# Patient Record
Sex: FEMALE | Race: WHITE | Employment: FULL TIME
[De-identification: names, ages, dates, MRNs, and addresses within clinical notes are randomized per-mention and may not be internally consistent; named-entity substitution may affect disease eponyms.]

---

## 2023-05-21 ENCOUNTER — TRANSCRIBE ORDERS (OUTPATIENT)
Dept: OTHER | Age: 58
End: 2023-05-21

## 2023-05-21 DIAGNOSIS — R90.82 WHITE MATTER ABNORMALITY ON MRI OF BRAIN: Primary | ICD-10-CM

## 2023-05-21 DIAGNOSIS — G37.9 DEMYELINATING DISEASE (H): ICD-10-CM

## 2023-05-25 NOTE — TELEPHONE ENCOUNTER
RECORDS RECEIVED FROM: Internal   REASON FOR VISIT: White matter abnormality on MRI of brain, Demyelinating disease   Date of Appt: 07/17/2023   NOTES (FOR ALL VISITS) STATUS DETAILS   OFFICE NOTE from referring provider Care Everywhere 04/18/2023 [facility]    OFFICE NOTE from other specialist N/A    DISCHARGE SUMMARY from hospital N/A    DISCHARGE REPORT from the ER N/A    OPERATIVE REPORT N/A    [facility] (MS ONLY) N/A    EMG N/A    EEG N/A    MEDICATION LIST N/A    IMAGING  (FOR ALL VISITS)     LUMBAR PUNCTURE N/A    BENTLEY SCAN (MOVEMENT) N/A    ULTRASOUND (CAROTID BILAT) *VASCULAR* N/A    MRI (HEAD, NECK, SPINE) Received 05/12/2023 head   CT (HEAD, NECK, SPINE) N/A       Images in PACS

## 2023-07-17 ENCOUNTER — OFFICE VISIT (OUTPATIENT)
Dept: NEUROLOGY | Facility: CLINIC | Age: 58
End: 2023-07-17
Payer: COMMERCIAL

## 2023-07-17 ENCOUNTER — PRE VISIT (OUTPATIENT)
Dept: NEUROLOGY | Facility: CLINIC | Age: 58
End: 2023-07-17

## 2023-07-17 VITALS — HEART RATE: 103 BPM | SYSTOLIC BLOOD PRESSURE: 128 MMHG | WEIGHT: 200 LBS | DIASTOLIC BLOOD PRESSURE: 85 MMHG

## 2023-07-17 DIAGNOSIS — G90.2 ACQUIRED RIGHT-SIDED HORNER SYNDROME: ICD-10-CM

## 2023-07-17 DIAGNOSIS — R90.82 WHITE MATTER ABNORMALITY ON MRI OF BRAIN: Primary | ICD-10-CM

## 2023-07-17 PROCEDURE — 99205 OFFICE O/P NEW HI 60 MIN: CPT | Performed by: PSYCHIATRY & NEUROLOGY

## 2023-07-17 RX ORDER — ASPIRIN 81 MG/1
81 TABLET ORAL
COMMUNITY

## 2023-07-17 RX ORDER — BRIMONIDINE TARTRATE AND TIMOLOL MALEATE 2; 5 MG/ML; MG/ML
SOLUTION OPHTHALMIC
COMMUNITY
Start: 2022-11-02

## 2023-07-17 RX ORDER — LISINOPRIL 10 MG/1
1 TABLET ORAL EVERY MORNING
COMMUNITY
Start: 2023-06-15

## 2023-07-17 RX ORDER — MONTELUKAST SODIUM 10 MG/1
1 TABLET ORAL EVERY MORNING
COMMUNITY
Start: 2023-06-15

## 2023-07-17 RX ORDER — CETIRIZINE HYDROCHLORIDE 10 MG/1
10 TABLET ORAL DAILY
COMMUNITY

## 2023-07-17 RX ORDER — ATORVASTATIN CALCIUM 20 MG/1
1 TABLET, FILM COATED ORAL AT BEDTIME
COMMUNITY
Start: 2023-06-15

## 2023-07-17 NOTE — PATIENT INSTRUCTIONS
I am recommending that you undergo MRI scans of the cervical and thoracic spine with and without contrast to look for any evidence of demyelinating disease in the spinal cord.    2.   We will refer you to neuro-ophthalmology at the [facility] for an eye exam to look for any evidence of demyelinating disease affecting visual pathways

## 2023-07-17 NOTE — LETTER
7/17/2023       RE: Deborah Dumont  [address] [place] [address]     Dear Dr. Schneider,    Thank you for referring your patient, Deborah Dumont, to the Multiple Sclerosis Clinic at [facility] [place]. Please see a copy of my visit note below.    Referral source:  Daysi Schneider MD  [facility]  [address] [id number]    Chief complaint: White matter abnormality on MRI scan of the brain; concern for CNS demyelinating disease    History of the Present Illness: Ms. Deborah Dumont is a 58 year old right-handed woman who is evaluated in the Multiple Sclerosis clinic today at the request of Dr. Schneider for an opinion regarding concern for multiple sclerosis.    The patient recalls that the possibility of demyelinating disease was initially mentioned when she was in her 30s and was felt to have some thinning of the left optic nerve detected on a routine eye examination. (She did not, then or now, have any history suggestive of a clinical episode of optic neuritis.)    A number of years later she was evaluated by my colleague, Dr. Bhargav Tee, on 5/13/2011.  Dr. Tee reviewed an MRI scan of the brain performed earlier that year, showing non-specific white matter abnormalities.  Optical coherence tomography study demonstrated some apparent retinal nerve fiber layer thinning on the right with no detected abnormality in the left. The patient subsequently underwent a cerebrospinal fluid evaluation demonstrating normal IgG index of 0.43 and no oligoclonal bands.  The index of suspicion for multiple sclerosis was low, and no follow-up was advised.    This spring, the patient was evaluated by Dr. Schneider after presenting with a complaint of dizziness.  This was felt to be due to benign paroxysmal positional vertigo and vestibular evaluation was consistent with that diagnosis as well.    The patient did undergo a repeat MRI scan of the brain demonstrating an increased  number of T2 hyperintense foci in the white matter as compared to earlier MRI in 2011, and again concern about the possibility of demyelinating disease was raised.    She was referred to this clinic for an opinion in this regard.    Review of Systems: In retrospect, the patient has been noticing some mild drooping of the right eyelid for approximately 1 year.    She notes that intermittently she will wake up with some numbness and tingling of the hands.    She endorses some difficulty recalling names or the word that she wants to say.    She describes bothersome fatigue.    Past Medical History:  Status post right carotid endarterectomy  Allergic rhinitis  Hyperlipidemia  Glaucoma  Gastroesophageal reflux disease  Hypertension   Depression  Vitiligo    Medications:  Atorvastatin 20 mg p.o. daily  Lisinopril 10 mg p.o. daily  Montelukast 10 mg p.o. daily  Esomeprazole 20 mg p.o. daily  Aspirin 81 mg p.o. daily  Fluoxetine 40 mg p.o. daily    Family History: There is no family history of multiple sclerosis of which she is aware.  Her sister is apparently felt to have some type of autoimmune disorder with Raynaud's phenomenon.  Her daughter has inflammatory bowel disease.    Social History: The patient is employed as a  for [employer].  She is a lifelong non-smoker.    PHYSICAL EXAMINATION:  VITAL SIGNS: Blood pressure 128/85; pulse 103; weight 90.7 kg.  GENERAL: Well-nourished woman who presents to the examination alone, awake and alert and in no acute distress.    NEUROLOGIC EXAMINATION:  CRANIAL NERVES: The right pupil is miotic (more evident in low light) and there is mild ptosis of the right eyelid.  Visual fields are full to confrontation.  Extraocular movements are intact with no internuclear ophthalmoplegia.  Facial strength is normal.  Palate elevation and tongue protrusion are normal.  POWER: Strength is within normal limits in proximal and distal muscles in the upper and lower limbs  throughout.  REFLEXES: Reflexes are symmetric and within normal limits in the arms and legs.  MOTOR/CEREBELLAR: There is no appendicular ataxia on finger-to-nose testing.  Rapid alternating movements are within normal limits in the hands and fingers.  There is no pronator drift in the arms.  GAIT: The patient is able to ambulate on a flat, level surface with no gross loss of postural stability.  She can walk on heels and toes.  Tandem gait is moderately impaired for age.    Investigations: I reviewed images from an MRI scan of the brain dated 5/12/2023.  There is no abnormal enhancement with gadolinium contrast.  There are several T2 hyperintense foci in the white matter of the cerebral hemispheres bilaterally as well as some increased T2 signal in the bradford.  A few of the supratentorial lesions are periventricular or juxtacortical in location.  Overall, the findings could be seen in multiple sclerosis in the appropriate clinical context, but are not pathognomonic for demyelinating pathology.    Assessment/plan    1. White matter abnormalities on MRI scan of the brain  I discussed several issues with the patient.    First, a diagnosis of multiple sclerosis cannot be made solely on the basis of MRI imaging.  All of the diagnostic criteria for MS were validated in patients with typical clinical syndromes of demyelinating disease, including a typical attack of active inflammatory demyelination or a progressive motor syndrome.  Neither of these are present here, and accordingly a diagnosis of MS cannot be made.    Regarding the appearance of the imaging, my suspicion for underlying demyelinating pathology is intermediate.  The primary differential diagnosis would be white matter changes due to small vessel ischemic disease, which is certainly possible in a patient in her age group with vascular risk factors.    Even if I were certain that she had underlying demyelinating disease (and again, I am not), the typical natural  history of multiple sclerosis in a patient approaching the seventh decade of life is often that the active inflammatory component of the disease process has burned itself out.  Accordingly, it is unlikely that we would be recommending disease modifying therapies for multiple sclerosis at this juncture.    We discussed other diagnostic investigations.  Previous spinal imaging was unrevealing, but I would suggest repeating MRI scans of the cervical and thoracic spine at this time.  Asymptomatic white matter abnormalities in the cord are much less common than in the brain and if present, would heighten the suspicion for an underlying demyelinating process.  Again, I would not be likely to recommend immune therapies at this time unless there were radiologic evidence of active inflammatory demyelination (that is, contrast-enhancing lesions), which is quite unlikely..    Lastly, as discussed further below I would recommend a neuro-ophthalmology examination to look for any evidence of demyelinating disease affecting the visual pathways.    After we review the above studies, we will decide on timing of ,and need for, further follow-up.  If there are no other remarkable findings identified, I would likely just recommend a follow-up brain MRI in 12 to 18 months.  If this remains stable and there are no further neurologic symptoms, I think further monitoring could be deferred.    2.  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    Anatomically, her prior right carotid surgery could certainly account for this, although interestingly she is quite certain that the eyelid droop did not become apparent until well after the surgery.    This should not be related to demyelinating disease and I suspect that the yield of additional imaging studies would be low.    3.   Query optic neuropathy  Some concern has been expressed about asymptomatic optic neuropathy on prior examinations.  I told her that I would suggest that we have her see one of the neuro-ophthalmologists at the [place] for a comprehensive eye exam and to look for any evidence of demyelinating disease affecting the visual pathways.    I spent a total of 61 minutes on patient care activities related to this encounter on the date of service, including time spent in reviewing internal and external medical records, performing my own independent review of neuroimaging, obtaining history and examination from and in counseling the patient.    Again, thank you for allowing me to participate in the care of your patient.      Sincerely,    Alexis Peterson MD   of Neurology  [facility]    Cc:  Patient

## 2023-07-17 NOTE — Clinical Note
7/17/2023         RE: Deborah Dumont  [address] [place] [address]        Dear Colleague,    Thank you for referring your patient, Deborah Dumont, to the [facility] [place]. Please see a copy of my visit note below.    Referral source:  Daysi Schneider MD  [facility]  [address] [place] [address]    Chief complaint: White matter abnormality on MRI scan of the brain; concern for CNS demyelinating disease    History of the Present Illness: Ms. Deborah Dumont is a 58 year old right-handed woman who is evaluated in the Multiple Sclerosis clinic today at the request of Dr. Schneider for an opinion regarding concern for multiple sclerosis.    The patient recalls that the possibility of demyelinating disease was initially mentioned when she was in her 30s and was felt to have some thinning of the left optic nerve detected on a routine eye examination. (She did not, then or now, have any history suggestive of a clinical episode of optic neuritis.)    A number of years later she was evaluated by my colleague, Dr. Bhargav Tee, on 5/13/2011.  Dr. Tee reviewed an MRI scan of the brain performed earlier that year, showing non-specific white matter abnormalities.  Optical coherence tomography study demonstrated some apparent retinal nerve fiber layer thinning on the right with no detected abnormality in the left. The patient subsequently underwent a cerebrospinal fluid evaluation demonstrating normal IgG index of 0.43 and no oligoclonal bands.  The index of suspicion for multiple sclerosis was low, and no follow-up was advised.    This spring, the patient was evaluated by Dr. Schneider after presenting with a complaint of dizziness.  This was felt to be due to benign paroxysmal positional vertigo and vestibular evaluation was consistent with that diagnosis as well.    The patient did undergo a repeat MRI scan of the brain demonstrating an increased number of T2  hyperintense foci in the white matter as compared to earlier MRI in 2011, and again concern about the possibility of demyelinating disease was raised.    She was referred to this clinic for an opinion in this regard.    Review of Systems: In retrospect, the patient has been noticing some mild drooping of the right eyelid for approximately 1 year.    She notes that intermittently she will wake up with some numbness and tingling of the hands.    She endorses some difficulty recalling names or the word that she wants to say.    She describes bothersome fatigue.    Past Medical History:  Status post right carotid endarterectomy  Allergic rhinitis  Hyperlipidemia  Glaucoma  Gastroesophageal reflux disease  Hypertension   Depression  Vitiligo    Medications:  Atorvastatin 20 mg p.o. daily  Lisinopril 10 mg p.o. daily  Montelukast 10 mg p.o. daily  Esomeprazole 20 mg p.o. daily  Aspirin 81 mg p.o. daily  Fluoxetine 40 mg p.o. daily    Family History: There is no family history of multiple sclerosis of which she is aware.  Her sister is apparently felt to have some type of autoimmune disorder with Raynaud's phenomenon.  Her daughter has inflammatory bowel disease.    Social History: The patient is employed as a  for [employer].  She is a lifelong non-smoker.    PHYSICAL EXAMINATION:  VITAL SIGNS: Blood pressure 128/85; pulse 103; weight 90.7 kg.  GENERAL: Well-nourished woman who presents to the examination alone, awake and alert and in no acute distress.    NEUROLOGIC EXAMINATION:  CRANIAL NERVES: The right pupil is miotic (more evident in low light) and there is mild ptosis of the right eyelid.  Visual fields are full to confrontation.  Extraocular movements are intact with no internuclear ophthalmoplegia.  Facial strength is normal.  Palate elevation and tongue protrusion are normal.  POWER: Strength is within normal limits in proximal and distal muscles in the upper and lower limbs  throughout.  REFLEXES: Reflexes are symmetric and within normal limits in the arms and legs.  MOTOR/CEREBELLAR: There is no appendicular ataxia on finger-to-nose testing.  Rapid alternating movements are within normal limits in the hands and fingers.  There is no pronator drift in the arms.  GAIT: The patient is able to ambulate on a flat, level surface with no gross loss of postural stability.  She can walk on heels and toes.  Tandem gait is moderately impaired for age.    Investigations: I reviewed images from an MRI scan of the brain dated 5/12/2023.  There is no abnormal enhancement with gadolinium contrast.  There are several T2 hyperintense foci in the white matter of the cerebral hemispheres bilaterally as well as some increased T2 signal in the bradford.  A few of the supratentorial lesions are periventricular or juxtacortical in location.  Overall, the findings could be seen in multiple sclerosis in the appropriate clinical context, but are not pathognomonic for demyelinating pathology.    Assessment/plan    1. White matter abnormalities on MRI scan of the brain  I discussed several issues with the patient.    First, a diagnosis of multiple sclerosis cannot be made solely on the basis of MRI imaging.  All of the diagnostic criteria for MS were validated in patients with typical clinical syndromes of demyelinating disease, including a typical attack of active inflammatory demyelination or a progressive motor syndrome.  Neither of these are present here, and accordingly a diagnosis of MS cannot be made.    Regarding the appearance of the imaging, my suspicion for underlying demyelinating pathology is intermediate.  The primary differential diagnosis would be white matter changes due to small vessel ischemic disease, which is certainly possible in a patient in her age group with vascular risk factors.    Even if I were certain that she had underlying demyelinating disease (and again, I am not), the typical natural  history of multiple sclerosis in a patient approaching the seventh decade of life is often that the active inflammatory component of the disease process has burned itself out.  Accordingly, it is unlikely that we would be recommending disease modifying therapies for multiple sclerosis at this juncture.    We discussed other diagnostic investigations.  Previous spinal imaging was unrevealing, but I would suggest repeating MRI scans of the cervical and thoracic spine at this time.  Asymptomatic white matter abnormalities in the cord are much less common than in the brain and if present, would heighten the suspicion for an underlying demyelinating process.  Again, I would not be likely to recommend immune therapies at this time unless there were radiologic evidence of active inflammatory demyelination (that is, contrast-enhancing lesions), which is quite unlikely..    Lastly, as discussed further below I would recommend a neuro-ophthalmology examination to look for any evidence of demyelinating disease affecting the visual pathways.    After we review the above studies, we will decide on timing of ,and need for, further follow-up.  If there are no other remarkable findings identified, I would likely just recommend a follow-up brain MRI in 12 to 18 months.  If this remains stable and there are no further neurologic symptoms, I think further monitoring could be deferred.    2.  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    Anatomically, her prior right carotid surgery could certainly account for this, although interestingly she is quite certain that the eyelid droop did not become apparent until well after the surgery.    This should not be related to demyelinating disease and I suspect that the yield of additional imaging studies would be low.    3.   Query optic neuropathy  Some concern has been expressed about asymptomatic optic neuropathy on prior examinations.  I told her that I would suggest that we have her see one of the neuro-ophthalmologists at the [place] for a comprehensive eye exam and to look for any evidence of demyelinating disease affecting the visual pathways.    I spent a total of 61 minutes on patient care activities related to this encounter on the date of service, including time spent in reviewing internal and external medical records, performing my own independent review of neuroimaging, obtaining history and examination from and in counseling the patient.      Again, thank you for allowing me to participate in the care of your patient.        Sincerely,        Alexis Peterson MD

## 2023-09-12 ENCOUNTER — TELEPHONE (OUTPATIENT)
Dept: NEUROLOGY | Facility: CLINIC | Age: 58
End: 2023-09-12
Payer: COMMERCIAL

## 2023-09-12 NOTE — LETTER
September 21, 2023      Deborah Dumont  [address] [place] [address]        Dear Deborah,     We have attempted to contact you by phone 3 times. Dr. Peterson wants to know if you'd like to follow up with his recommendations. He recommended a neuro-ophthalmology appointment at the [place], if you are interested, you can call [phone] to schedule. He also recommended new MRI scans of the cervical/thoracic spine. If you'd like to move forward with these, we can schedule them through [facility] or somewhere closer to home. Please let us know if you are interested in this.      Sincerely,    [facility]

## 2023-09-12 NOTE — TELEPHONE ENCOUNTER
----- Message from Alexis Peterson MD sent at 9/12/2023  2:02 PM CDT -----  Patient for consult in July. I recommended a neuro-ophthalmology appointment at the [facility], which has not been scheduled (they tried to contact her but did not reach her). She can call [phone] to schedule.    I also recommended MRI scans of the cervical and thoracic spine, which I believe she wanted to do closer to home, but have not seen any results from these either. Please contact the patient and assist her with arranging these appointments, if desired.    Thanks    Juventino Peterson MD

## 2023-09-12 NOTE — PROGRESS NOTES
Referral source:  Daysi Schneider MD  [facility]  [address] [place] [address]    Chief complaint: White matter abnormality on MRI scan of the brain; concern for CNS demyelinating disease    History of the Present Illness: Ms. Deborah Dumont is a 58 year old right-handed woman who is evaluated in the Multiple Sclerosis clinic today at the request of Dr. Schneider for an opinion regarding concern for multiple sclerosis.    The patient recalls that the possibility of demyelinating disease was initially mentioned when she was in her 30s and was felt to have some thinning of the left optic nerve detected on a routine eye examination. (She did not, then or now, have any history suggestive of a clinical episode of optic neuritis.)    A number of years later she was evaluated by my colleague, Dr. Bhargav Tee, on 5/13/2011.  Dr. Tee reviewed an MRI scan of the brain performed earlier that year, showing non-specific white matter abnormalities.  Optical coherence tomography study demonstrated some apparent retinal nerve fiber layer thinning on the right with no detected abnormality in the left. The patient subsequently underwent a cerebrospinal fluid evaluation demonstrating normal IgG index of 0.43 and no oligoclonal bands.  The index of suspicion for multiple sclerosis was low, and no follow-up was advised.    This spring, the patient was evaluated by Dr. Schneider after presenting with a complaint of dizziness.  This was felt to be due to benign paroxysmal positional vertigo and vestibular evaluation was consistent with that diagnosis as well.    The patient did undergo a repeat MRI scan of the brain demonstrating an increased number of T2 hyperintense foci in the white matter as compared to earlier MRI in 2011, and again concern about the possibility of demyelinating disease was raised.    She was referred to this clinic for an opinion in this regard.    Review of Systems: In retrospect, the patient has  been noticing some mild drooping of the right eyelid for approximately 1 year.    She notes that intermittently she will wake up with some numbness and tingling of the hands.    She endorses some difficulty recalling names or the word that she wants to say.    She describes bothersome fatigue.    Past Medical History:  Status post right carotid endarterectomy  Allergic rhinitis  Hyperlipidemia  Glaucoma  Gastroesophageal reflux disease  Hypertension   Depression  Vitiligo    Medications:  Atorvastatin 20 mg p.o. daily  Lisinopril 10 mg p.o. daily  Montelukast 10 mg p.o. daily  Esomeprazole 20 mg p.o. daily  Aspirin 81 mg p.o. daily  Fluoxetine 40 mg p.o. daily    Family History: There is no family history of multiple sclerosis of which she is aware.  Her sister is apparently felt to have some type of autoimmune disorder with Raynaud's phenomenon.  Her daughter has inflammatory bowel disease.    Social History: The patient is employed as a  for [employer].  She is a lifelong non-smoker.    PHYSICAL EXAMINATION:  VITAL SIGNS: Blood pressure 128/85; pulse 103; weight 90.7 kg.  GENERAL: Well-nourished woman who presents to the examination alone, awake and alert and in no acute distress.    NEUROLOGIC EXAMINATION:  CRANIAL NERVES: The right pupil is miotic (more evident in low light) and there is mild ptosis of the right eyelid.  Visual fields are full to confrontation.  Extraocular movements are intact with no internuclear ophthalmoplegia.  Facial strength is normal.  Palate elevation and tongue protrusion are normal.  POWER: Strength is within normal limits in proximal and distal muscles in the upper and lower limbs throughout.  REFLEXES: Reflexes are symmetric and within normal limits in the arms and legs.  MOTOR/CEREBELLAR: There is no appendicular ataxia on finger-to-nose testing.  Rapid alternating movements are within normal limits in the hands and fingers.  There is no pronator drift in the  arms.  GAIT: The patient is able to ambulate on a flat, level surface with no gross loss of postural stability.  She can walk on heels and toes.  Tandem gait is moderately impaired for age.    Investigations: I reviewed images from an MRI scan of the brain dated 5/12/2023.  There is no abnormal enhancement with gadolinium contrast.  There are several T2 hyperintense foci in the white matter of the cerebral hemispheres bilaterally as well as some increased T2 signal in the bradford.  A few of the supratentorial lesions are periventricular or juxtacortical in location.  Overall, the findings could be seen in multiple sclerosis in the appropriate clinical context, but are not pathognomonic for demyelinating pathology.    Assessment/plan    1. White matter abnormalities on MRI scan of the brain  I discussed several issues with the patient.    First, a diagnosis of multiple sclerosis cannot be made solely on the basis of MRI imaging.  All of the diagnostic criteria for MS were validated in patients with typical clinical syndromes of demyelinating disease, including a typical attack of active inflammatory demyelination or a progressive motor syndrome.  Neither of these are present here, and accordingly a diagnosis of MS cannot be made.    Regarding the appearance of the imaging, my suspicion for underlying demyelinating pathology is intermediate.  The primary differential diagnosis would be white matter changes due to small vessel ischemic disease, which is certainly possible in a patient in her age group with vascular risk factors.    Even if I were certain that she had underlying demyelinating disease (and again, I am not), the typical natural history of multiple sclerosis in a patient approaching the seventh decade of life is often that the active inflammatory component of the disease process has burned itself out.  Accordingly, it is unlikely that we would be recommending disease modifying therapies for multiple sclerosis  at this juncture.    We discussed other diagnostic investigations.  Previous spinal imaging was unrevealing, but I would suggest repeating MRI scans of the cervical and thoracic spine at this time.  Asymptomatic white matter abnormalities in the cord are much less common than in the brain and if present, would heighten the suspicion for an underlying demyelinating process.  Again, I would not be likely to recommend immune therapies at this time unless there were radiologic evidence of active inflammatory demyelination (that is, contrast-enhancing lesions), which is quite unlikely..    Lastly, as discussed further below I would recommend a neuro-ophthalmology examination to look for any evidence of demyelinating disease affecting the visual pathways.    After we review the above studies, we will decide on timing of ,and need for, further follow-up.  If there are no other remarkable findings identified, I would likely just recommend a follow-up brain MRI in 12 to 18 months.  If this remains stable and there are no further neurologic symptoms, I think further monitoring could be deferred.    2.  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    Anatomically, her prior right carotid surgery could certainly account for this, although interestingly she is quite certain that the eyelid droop did not become apparent until well after the surgery.    This should not be related to demyelinating disease and I suspect that the yield of additional imaging studies would be low.    3.  Query optic neuropathy  Some concern has been expressed about asymptomatic optic neuropathy on prior examinations.  I told her that I would suggest that we have her see one of the neuro-ophthalmologists at the [facility] for a comprehensive eye exam and to look for any evidence of  demyelinating disease affecting the visual pathways.    I spent a total of 61 minutes on patient care activities related to this encounter on the date of service, including time spent in reviewing internal and external medical records, performing my own independent review of neuroimaging, obtaining history and examination from and in counseling the patient.

## 2023-09-12 NOTE — TELEPHONE ENCOUNTER
Called and left patient a voicemail asking patient to call back. Want to follow up on message from Dr. Peterson.

## 2023-09-21 NOTE — TELEPHONE ENCOUNTER
Called and left patient a voicemail asking patient to call back. Want to follow up on message from Dr. Peterson. Also sent a letter.

## 2024-09-14 ENCOUNTER — HEALTH MAINTENANCE LETTER (OUTPATIENT)
Age: 59
End: 2024-09-14